# Patient Record
Sex: FEMALE | Race: BLACK OR AFRICAN AMERICAN | NOT HISPANIC OR LATINO | Employment: FULL TIME | URBAN - METROPOLITAN AREA
[De-identification: names, ages, dates, MRNs, and addresses within clinical notes are randomized per-mention and may not be internally consistent; named-entity substitution may affect disease eponyms.]

---

## 2023-12-08 ENCOUNTER — HOSPITAL ENCOUNTER (EMERGENCY)
Facility: CLINIC | Age: 28
Discharge: HOME | End: 2023-12-08
Attending: EMERGENCY MEDICINE | Admitting: EMERGENCY MEDICINE
Payer: COMMERCIAL

## 2023-12-08 ENCOUNTER — APPOINTMENT (OUTPATIENT)
Dept: GENERAL RADIOLOGY | Facility: CLINIC | Age: 28
End: 2023-12-08
Attending: EMERGENCY MEDICINE
Payer: COMMERCIAL

## 2023-12-08 ENCOUNTER — HOSPITAL ENCOUNTER (EMERGENCY)
Dept: CARDIOLOGY | Facility: CLINIC | Age: 28
Discharge: HOME | End: 2023-12-08
Attending: EMERGENCY MEDICINE
Payer: COMMERCIAL

## 2023-12-08 ENCOUNTER — APPOINTMENT (OUTPATIENT)
Dept: CT IMAGING | Facility: CLINIC | Age: 28
End: 2023-12-08
Attending: EMERGENCY MEDICINE
Payer: COMMERCIAL

## 2023-12-08 VITALS
TEMPERATURE: 98.9 F | HEART RATE: 116 BPM | RESPIRATION RATE: 16 BRPM | SYSTOLIC BLOOD PRESSURE: 117 MMHG | OXYGEN SATURATION: 100 % | DIASTOLIC BLOOD PRESSURE: 87 MMHG

## 2023-12-08 DIAGNOSIS — R55 SYNCOPE AND COLLAPSE: ICD-10-CM

## 2023-12-08 DIAGNOSIS — R94.31 ABNORMAL ELECTROCARDIOGRAM: ICD-10-CM

## 2023-12-08 LAB
ANION GAP SERPL CALCULATED.3IONS-SCNC: 14 MMOL/L (ref 7–15)
ATRIAL RATE - MUSE: 62 BPM
ATRIAL RATE - MUSE: 69 BPM
BASOPHILS # BLD AUTO: ABNORMAL 10*3/UL
BASOPHILS # BLD MANUAL: 0 10E3/UL (ref 0–0.2)
BASOPHILS NFR BLD AUTO: ABNORMAL %
BASOPHILS NFR BLD MANUAL: 0 %
BUN SERPL-MCNC: 14.6 MG/DL (ref 6–20)
CALCIUM SERPL-MCNC: 9.2 MG/DL (ref 8.6–10)
CHLORIDE SERPL-SCNC: 102 MMOL/L (ref 98–107)
CREAT SERPL-MCNC: 0.7 MG/DL (ref 0.51–0.95)
D DIMER PPP FEU-MCNC: 0.83 UG/ML FEU (ref 0–0.5)
DEPRECATED HCO3 PLAS-SCNC: 23 MMOL/L (ref 22–29)
DIASTOLIC BLOOD PRESSURE - MUSE: NORMAL MMHG
DIASTOLIC BLOOD PRESSURE - MUSE: NORMAL MMHG
EGFRCR SERPLBLD CKD-EPI 2021: >90 ML/MIN/1.73M2
EOSINOPHIL # BLD AUTO: ABNORMAL 10*3/UL
EOSINOPHIL # BLD MANUAL: 0.1 10E3/UL (ref 0–0.7)
EOSINOPHIL NFR BLD AUTO: ABNORMAL %
EOSINOPHIL NFR BLD MANUAL: 1 %
ERYTHROCYTE [DISTWIDTH] IN BLOOD BY AUTOMATED COUNT: 13.9 % (ref 10–15)
GLUCOSE SERPL-MCNC: 106 MG/DL (ref 70–99)
HCG SERPL QL: NEGATIVE
HCT VFR BLD AUTO: 45.1 % (ref 35–47)
HGB BLD-MCNC: 13.9 G/DL (ref 11.7–15.7)
HOLD SPECIMEN: NORMAL
IMM GRANULOCYTES # BLD: ABNORMAL 10*3/UL
IMM GRANULOCYTES NFR BLD: ABNORMAL %
INTERPRETATION ECG - MUSE: NORMAL
INTERPRETATION ECG - MUSE: NORMAL
LVEF ECHO: NORMAL
LYMPHOCYTES # BLD AUTO: ABNORMAL 10*3/UL
LYMPHOCYTES # BLD MANUAL: 1.8 10E3/UL (ref 0.8–5.3)
LYMPHOCYTES NFR BLD AUTO: ABNORMAL %
LYMPHOCYTES NFR BLD MANUAL: 29 %
MCH RBC QN AUTO: 26.9 PG (ref 26.5–33)
MCHC RBC AUTO-ENTMCNC: 30.8 G/DL (ref 31.5–36.5)
MCV RBC AUTO: 87 FL (ref 78–100)
MONOCYTES # BLD AUTO: ABNORMAL 10*3/UL
MONOCYTES # BLD MANUAL: 0.3 10E3/UL (ref 0–1.3)
MONOCYTES NFR BLD AUTO: ABNORMAL %
MONOCYTES NFR BLD MANUAL: 5 %
NEUTROPHILS # BLD AUTO: ABNORMAL 10*3/UL
NEUTROPHILS # BLD MANUAL: 4 10E3/UL (ref 1.6–8.3)
NEUTROPHILS NFR BLD AUTO: ABNORMAL %
NEUTROPHILS NFR BLD MANUAL: 65 %
NRBC # BLD AUTO: 0 10E3/UL
NRBC BLD AUTO-RTO: 0 /100
P AXIS - MUSE: 66 DEGREES
P AXIS - MUSE: 71 DEGREES
PLAT MORPH BLD: NORMAL
PLATELET # BLD AUTO: 218 10E3/UL (ref 150–450)
POTASSIUM SERPL-SCNC: 4.1 MMOL/L (ref 3.4–5.3)
PR INTERVAL - MUSE: 130 MS
PR INTERVAL - MUSE: 134 MS
QRS DURATION - MUSE: 66 MS
QRS DURATION - MUSE: 70 MS
QT - MUSE: 358 MS
QT - MUSE: 362 MS
QTC - MUSE: 367 MS
QTC - MUSE: 383 MS
R AXIS - MUSE: 48 DEGREES
R AXIS - MUSE: 57 DEGREES
RBC # BLD AUTO: 5.17 10E6/UL (ref 3.8–5.2)
RBC MORPH BLD: NORMAL
SODIUM SERPL-SCNC: 139 MMOL/L (ref 135–145)
SYSTOLIC BLOOD PRESSURE - MUSE: NORMAL MMHG
SYSTOLIC BLOOD PRESSURE - MUSE: NORMAL MMHG
T AXIS - MUSE: -25 DEGREES
T AXIS - MUSE: -36 DEGREES
TROPONIN T SERPL HS-MCNC: <6 NG/L
TROPONIN T SERPL HS-MCNC: <6 NG/L
TSH SERPL DL<=0.005 MIU/L-ACNC: 1.72 UIU/ML (ref 0.3–4.2)
VENTRICULAR RATE- MUSE: 62 BPM
VENTRICULAR RATE- MUSE: 69 BPM
WBC # BLD AUTO: 6.1 10E3/UL (ref 4–11)

## 2023-12-08 PROCEDURE — 96360 HYDRATION IV INFUSION INIT: CPT | Mod: 59

## 2023-12-08 PROCEDURE — 85027 COMPLETE CBC AUTOMATED: CPT | Performed by: EMERGENCY MEDICINE

## 2023-12-08 PROCEDURE — 84484 ASSAY OF TROPONIN QUANT: CPT | Performed by: EMERGENCY MEDICINE

## 2023-12-08 PROCEDURE — 85007 BL SMEAR W/DIFF WBC COUNT: CPT | Performed by: EMERGENCY MEDICINE

## 2023-12-08 PROCEDURE — 96361 HYDRATE IV INFUSION ADD-ON: CPT

## 2023-12-08 PROCEDURE — 93244 EXT ECG>48HR<7D REV&INTERPJ: CPT | Performed by: INTERNAL MEDICINE

## 2023-12-08 PROCEDURE — 84703 CHORIONIC GONADOTROPIN ASSAY: CPT | Performed by: EMERGENCY MEDICINE

## 2023-12-08 PROCEDURE — 85379 FIBRIN DEGRADATION QUANT: CPT | Performed by: EMERGENCY MEDICINE

## 2023-12-08 PROCEDURE — 93005 ELECTROCARDIOGRAM TRACING: CPT | Mod: XU

## 2023-12-08 PROCEDURE — 71275 CT ANGIOGRAPHY CHEST: CPT

## 2023-12-08 PROCEDURE — 93306 TTE W/DOPPLER COMPLETE: CPT | Mod: 26 | Performed by: INTERNAL MEDICINE

## 2023-12-08 PROCEDURE — 80048 BASIC METABOLIC PNL TOTAL CA: CPT | Performed by: EMERGENCY MEDICINE

## 2023-12-08 PROCEDURE — 36415 COLL VENOUS BLD VENIPUNCTURE: CPT | Performed by: EMERGENCY MEDICINE

## 2023-12-08 PROCEDURE — 99285 EMERGENCY DEPT VISIT HI MDM: CPT | Mod: 25

## 2023-12-08 PROCEDURE — 93242 EXT ECG>48HR<7D RECORDING: CPT

## 2023-12-08 PROCEDURE — 71046 X-RAY EXAM CHEST 2 VIEWS: CPT

## 2023-12-08 PROCEDURE — 84443 ASSAY THYROID STIM HORMONE: CPT | Performed by: EMERGENCY MEDICINE

## 2023-12-08 PROCEDURE — 250N000009 HC RX 250: Performed by: EMERGENCY MEDICINE

## 2023-12-08 PROCEDURE — 250N000011 HC RX IP 250 OP 636: Performed by: EMERGENCY MEDICINE

## 2023-12-08 PROCEDURE — 258N000003 HC RX IP 258 OP 636: Performed by: EMERGENCY MEDICINE

## 2023-12-08 PROCEDURE — 93306 TTE W/DOPPLER COMPLETE: CPT

## 2023-12-08 RX ORDER — IOPAMIDOL 755 MG/ML
500 INJECTION, SOLUTION INTRAVASCULAR ONCE
Status: COMPLETED | OUTPATIENT
Start: 2023-12-08 | End: 2023-12-08

## 2023-12-08 RX ADMIN — SODIUM CHLORIDE 100 ML: 9 INJECTION, SOLUTION INTRAVENOUS at 10:29

## 2023-12-08 RX ADMIN — IOPAMIDOL 51 ML: 755 INJECTION, SOLUTION INTRAVENOUS at 10:29

## 2023-12-08 RX ADMIN — SODIUM CHLORIDE 1000 ML: 9 INJECTION, SOLUTION INTRAVENOUS at 10:04

## 2023-12-08 ASSESSMENT — ACTIVITIES OF DAILY LIVING (ADL): ADLS_ACUITY_SCORE: 35

## 2023-12-08 NOTE — ED PROVIDER NOTES
History     Chief Complaint:  Syncope    The history is provided by the patient.     Amy Aparicio is a 28 year old female presenting via EMS for evaluation of syncope. Amy explains that she experienced a syncopal episode at work earlier today. She notes hitting the right side of her head. She reports chronic intermittent shortness of breath. She denies chest pain or shortness of breath prior to losing consciousness. She denies back pain, neck pain, diplopia, or blurred vision. She denies abdominal pain, nausea, vomiting, or diarrhea. She denies a personal or family history of heart or lung disease. She denies a concern for pregnancy. Amy reports that she experienced a similar syncopal event six years ago and was not seen afterwards.    Independent Historian:   None - Patient Only    Medications:    The patient has no known daily or prescription medications.    Past Medical History:    The patient has no known pertinent past medical history.    Physical Exam   Patient Vitals for the past 24 hrs:   BP Temp Temp src Pulse Resp SpO2   12/08/23 0930 117/87 -- -- 116 -- 100 %   12/08/23 0920 113/73 -- -- 70 -- --   12/08/23 0747 (!) 114/90 98.9  F (37.2  C) Temporal 77 16 100 %     Physical Exam  General: Alert, appears well-developed and well-nourished. Cooperative.     In mild distress  HEENT:  Head:  Atraumatic  Ears:  External ears are normal  Mouth/Throat:  Oropharynx is without erythema or exudate and mucous membranes are moist.   Eyes:   Conjunctivae normal and EOM are normal. No scleral icterus.  CV:  Normal rate, regular rhythm, normal heart sounds and radial pulses are 2+ and symmetric.  No murmur.  Resp:  Breath sounds are clear bilaterally    Non-labored, no retractions or accessory muscle use  GI:  Abdomen is soft, no distension, no tenderness. No rebound or guarding.  No CVA tenderness bilaterally  MS:  Normal range of motion. No edema.    Normal strength in all 4 extremities.     Back atraumatic.    No  Declines a colonoscopy but is willing to go for cologuard midline cervical, thoracic, or lumbar tenderness  Skin:  Warm and dry.  No rash or lesions noted.  Neuro:   Alert. Normal strength.  GCS: 15  Psych: Normal mood and affect.    Emergency Department Course   ECG  ECG taken at 0808, ECG read at 0811  Sinus rhythm with marked sinus arrhythmia with occasional premature ventricular complexes  T wave abnormality, consider inferior ischemia  T wave abnormality, consider anterolateral ischemia  Abnormal ECG  Rate 69 bpm. MO interval 134 ms. QRS duration 70 ms. QT/QTc 358/383 ms. P-R-T axes 66 57 -36.     Imaging:  CT Chest Pulmonary Embolism w Contrast   Final Result   IMPRESSION:   No acute process demonstrated.      IRMA TOPETE MD            SYSTEM ID:  [id number]      XR Chest 2 Views   Final Result   IMPRESSION: No focal consolidation, pleural effusion or pneumothorax.   Cardiomediastinal silhouette is unremarkable.       JOSELIN VILLA MD            SYSTEM ID:  [id number]         Laboratory:  Labs Ordered and Resulted from Time of ED Arrival to Time of ED Departure   BASIC METABOLIC PANEL - Abnormal       Result Value    Sodium 139      Potassium 4.1      Chloride 102      Carbon Dioxide (CO2) 23      Anion Gap 14      Urea Nitrogen 14.6      Creatinine 0.70      GFR Estimate >90      Calcium 9.2      Glucose 106 (*)    CBC WITH PLATELETS AND DIFFERENTIAL - Abnormal    WBC Count 6.1      RBC Count 5.17      Hemoglobin 13.9      Hematocrit 45.1      MCV 87      MCH 26.9      MCHC 30.8 (*)     RDW 13.9      Platelet Count 218      % Neutrophils        % Lymphocytes        % Monocytes        % Eosinophils        % Basophils        % Immature Granulocytes        NRBCs per 100 WBC 0      Absolute Neutrophils        Absolute Lymphocytes        Absolute Monocytes        Absolute Eosinophils        Absolute Basophils        Absolute Immature Granulocytes        Absolute NRBCs 0.0     D DIMER QUANTITATIVE - Abnormal    D-Dimer Quantitative 0.83 (*)    TROPONIN T, HIGH  SENSITIVITY - Normal    Troponin T, High Sensitivity <6     HCG QUALITATIVE PREGNANCY - Normal    hCG Serum Qualitative Negative     TROPONIN T, HIGH SENSITIVITY - Normal    Troponin T, High Sensitivity <6     TSH WITH FREE T4 REFLEX - Normal    TSH 1.72     DIFFERENTIAL    % Neutrophils 65      % Lymphocytes 29      % Monocytes 5      % Eosinophils 1      % Basophils 0      Absolute Neutrophils 4.0      Absolute Lymphocytes 1.8      Absolute Monocytes 0.3      Absolute Eosinophils 0.1      Absolute Basophils 0.0      RBC Morphology Confirmed RBC Indices      Platelet Assessment        Value: Automated Count Confirmed. Platelet morphology is normal.      Emergency Department Course & Assessments:       Interventions:  Medications   sodium chloride 0.9% BOLUS 1,000 mL (0 mLs Intravenous Stopped 12/8/23 1222)   iopamidol (ISOVUE-370) solution 500 mL (51 mLs Intravenous $Given 12/8/23 1029)     Assessments:  0909 I obtained history and examined the patient as noted above.  1006 I rechecked the patient.    1205 I discussed findings and discharge with the patient. All questions answered.     Independent Interpretation (X-rays, CTs, rhythm strip):  I reviewed the patient's chest X-ray and note no pneumothorax or pneumonia.    Consultations/Discussion of Management or Tests:  ED Course as of 12/08/23 1704   Fri Dec 08, 2023   1109 I spoke with Dr. Franco Arteaga, cardiology, regarding the patient's history and presentation in the emergency department today.    1115 I spoke again with Dr. Arteaga, cardiology.     Social Determinants of Health affecting care:   Healthcare Access/Compliance    Disposition:  The patient was discharged to home.     Impression & Plan    Medical Decision Making:  Amy Aparicio is a 28 year old female who presents with a history and clinical exam consistent with syncope.  While vasovagal/orthostatic hypotension was the most likely etiology given the history of this patient, I considered a  broad differential for their syncope today including cardiac arrythmia, ACS, aortic stenosis, HOCM, PE, orthostatic hypotension, drugs, situational, carotid hypersensitivity, seizure, TIA, stroke, vasovagal.   Workup relatively reassuring except for a abnormal baseline EKG.  There are no prior EKGs to compare with but patient has relatively diffuse T wave inversions in the inferior anterior and lateral leads.  She has no active chest pain or prodromal symptoms prior to her syncopal event.  Troponin undetectable and low suspicion for ACS.  I did speak with cardiology briefly in regards the patient's presentation and abnormal EKG.  They had recommended outpatient echocardiogram, Holter monitor, and close outpatient follow-up in cardiology clinic.  In addition,she has no family history of sudden death, no chest pain, no seizure activity or post-ictal period, no murmur, and no signs of persistent orthostasis in the ED, no focal neurologic symptoms, and no complaints of concerning headache.  The workup in the ED is negative and the physical exam is re-assurring.  Supportive outpatient management is therefore indicated.  Patient was discharged to cardiopulmonary for outpatient echocardiogram following ED visit today.    Diagnosis:    ICD-10-CM    1. Syncope and collapse  R55 Echocardiogram Complete     Leadless EKG Monitor 3 to 14 Days     Follow-Up with Cardiology      2. Abnormal electrocardiogram  R94.31 Echocardiogram Complete     Leadless EKG Monitor 3 to 14 Days     Follow-Up with Cardiology        Scribe Disclosure:  IMartha, am serving as a scribe at 9:09 AM on 12/8/2023 to document services personally performed by Kingsley Yi MD based on my observations and the provider's statements to me.   12/8/2023   Kingsley Yi MD White, Scott, MD  12/08/23 5709

## 2023-12-08 NOTE — DISCHARGE INSTRUCTIONS
Abnormal EKG today.  Thank you the rest of your workup was normal.  You should go for an echocardiogram or ultrasound of your heart as well as a heart monitor and plan to follow-up with a cardiologist in the next week.  Please return to the emergency department if you have a repeat episode of passing out or any new symptoms develop.  If your echocardiogram is abnormal they may send you back to the emergency department for repeat evaluation.    Discharge Instructions  Syncope    Syncope (fainting) is a sudden, short loss of consciousness (passing out spell). People will usually fall to the ground when they faint or slump over if seated.  People may also shake when this happens, and it can sometimes be difficult to tell the difference between syncope and a seizure. At this time, your provider does not find a reason to suspect that your fainting spell is a sign of anything dangerous or life-threatening.  However, sometimes the signs of serious illness do not show up right away.     Generally, every Emergency Department visit should have a follow-up clinic visit with either a primary or a specialty clinic/provider. Please follow-up as instructed by your emergency provider today.    Return to the Emergency Department if:  You faint again.   You have any significant bleeding.  You have chest pain or a fast or irregular heartbeat.  You feel short of breath.  You cough up any blood.  You have abdominal (belly) pain or unusual back pain.  You have ongoing vomiting (throwing up) or diarrhea (loose stools).  You have a black or tarry bowel movement, or blood in the stool or in your vomit.  You have a fever over 101 F.  You lose feeling or cannot move a part of your body or cannot talk normally.  You are confused, have a headache, cannot see well, or have a seizure.  DO NOT DRIVE. CALL 911 INSTEAD!    What can I do to help myself?  Follow any specific instructions that your provider discussed with you.  If you feel  light-headed, make sure to sit down right away, even if you have to sit on the floor.  Follow up with your regular medical provider as discussed for further management. This may include lowering your blood pressure medications, insulin or other diabetic medications, checking your blood sugar more frequently, and drinking more fluids, taking medicines for vomiting or diarrhea or getting up slower.  If you were given a prescription for medicine here today, be sure to read all of the information (including the package insert) that comes with your prescription.  This will include important information about the medicine, its side effects, and any warnings that you need to know about.  The pharmacist who fills the prescription can provide more information and answer questions you may have about the medicine.  If you have questions or concerns that the pharmacist cannot address, please call or return to the Emergency Department.   Remember that you can always come back to the Emergency Department if you are not able to see your regular provider in the amount of time listed above, if you get any new symptoms, or if there is anything that worries you.

## 2023-12-08 NOTE — ED TRIAGE NOTES
Pt BIBA after syncopal episode falling hitting head at work today.  She reports that all she remembers was getting needles at work then waking up on the ground. Pt having head and left knee pain.     Triage Assessment (Adult)       Row Name 12/08/23 0746          Triage Assessment    Airway WDL WDL        Respiratory WDL    Respiratory WDL WDL        Cardiac WDL    Cardiac WDL WDL

## 2024-03-29 ENCOUNTER — OFFICE VISIT (OUTPATIENT)
Dept: CARDIOLOGY | Facility: CLINIC | Age: 29
End: 2024-03-29
Attending: EMERGENCY MEDICINE
Payer: COMMERCIAL

## 2024-03-29 VITALS
SYSTOLIC BLOOD PRESSURE: 122 MMHG | OXYGEN SATURATION: 100 % | DIASTOLIC BLOOD PRESSURE: 82 MMHG | HEART RATE: 85 BPM | WEIGHT: 113.9 LBS

## 2024-03-29 DIAGNOSIS — R55 SYNCOPE AND COLLAPSE: Primary | ICD-10-CM

## 2024-03-29 DIAGNOSIS — R94.31 ABNORMAL ELECTROCARDIOGRAM: ICD-10-CM

## 2024-03-29 DIAGNOSIS — R63.4 WEIGHT LOSS: ICD-10-CM

## 2024-03-29 PROCEDURE — 93000 ELECTROCARDIOGRAM COMPLETE: CPT | Performed by: INTERNAL MEDICINE

## 2024-03-29 PROCEDURE — 99203 OFFICE O/P NEW LOW 30 MIN: CPT | Performed by: INTERNAL MEDICINE

## 2024-03-29 NOTE — PROGRESS NOTES
HPI and Plan:   Amy is a 29-year-old woman referred because syncope, near syncope, lightheadedness and an abnormal EKG.    Amy has no significant past medical history.  She is on no medications.  She was seen in the ER on December 8 after a syncopal spell where she struck her head.  She describes lightheadedness and dizziness that can occur while sitting or standing.  It never occurs while lying down.  She does not describe orthostatic symptoms.  She has no chest arm neck jaw or shoulder discomfort.  She has no dyspnea on exertion orthopnea or PND.  She relates she has lost about 22 pounds of weight over the last 6 months.  She thinks she is eating adequately.  She states she may not drink enough fluids.      Workup in the ER included normal CMP, CBC, troponin, TSH, D-dimer.  EKG demonstrated marked sinus arrhythmia with rate of 62.    Subsequent echocardiogram was normal with ejection fraction of 55 to 60%.  A 6-day Zio patch demonstrated average heart rate of 79 ranging from 45-1 71.  Higher heart rates were sinus tachycardia.  Her triggered events were just isolated PVCs.  She had 23% PVCs.  30.2% PACs.  EKG today demonstrates a normal sinus rhythm with a short [place] interval otherwise normal.    Patient states she feels lightheaded in the office.  Systolic blood pressure is 98.    Assessment and plan.  I do not think Amy has any underlying cardiac pathology.  She has a structurally normal heart.  No symptoms to suggest ischemia.  No significant arrhythmias are noted.  I suspect she is thin asthenic with 22 pound weight loss and episodes are most likely related to the hypotension.  I have told her she needs to drink plenty of fluids.  She should increase the salt in her diet.  She needs to eat more and gain weight.    At this time I do not think any further cardiac evaluation or follow-up are indicated. Thank you for allow me to participate in this patient's care.  Sincerely,                               Michael  [facility]      Today's clinic visit entailed:  Review of external notes as documented elsewhere in note  Review of the result(s) of each unique test - EKG, echocardiogram, Zio Patch, lab work  The following tests were independently interpreted by me as noted in my documentation: EKG, Zio Patch  30 minutes spent by me on the date of the encounter doing chart review, history and exam, documentation and further activities per the note  Provider  Link to MDM Help Grid     The level of medical decision making during this visit was of moderate complexity.      Orders Placed This Encounter   Procedures    EKG 12-lead complete w/read - Clinics (performed today)       No orders of the defined types were placed in this encounter.      There are no discontinued medications.      Encounter Diagnoses   Name Primary?    Syncope and collapse Yes    Abnormal electrocardiogram     Weight loss        CURRENT MEDICATIONS:  No current outpatient medications on file.       ALLERGIES   No Known Allergies    PAST MEDICAL HISTORY:  No past medical history on file.    PAST SURGICAL HISTORY:  No past surgical history on file.    FAMILY HISTORY:  No family history on file.    SOCIAL HISTORY:  Social History     Socioeconomic History    Marital status: Single     Spouse name: None    Number of children: None    Years of education: None    Highest education level: None   Tobacco Use    Smoking status: Never    Smokeless tobacco: Never   Substance and Sexual Activity    Alcohol use: Never    Drug use: Never       Review of Systems:  Skin:  not assessed       Eyes:  not assessed      ENT:  not assessed      Respiratory:  Negative       Cardiovascular:    lightheadedness;dizziness;syncope or near-syncope;Positive for    Gastroenterology: not assessed      Genitourinary:  not assessed      Musculoskeletal:  not assessed      Neurologic:  not assessed      Psychiatric:  not assessed      Heme/Lymph/Imm:  not assessed      Endocrine:  not  assessed        Physical Exam:  Vitals: /82 (BP Location: Right arm, Patient Position: Sitting, Cuff Size: Adult Regular)   Pulse 85   Wt 51.7 kg (113 lb 14.4 oz)   SpO2 100%     Constitutional:  cooperative, alert and oriented, well developed, well nourished, in no acute distress thin      Skin:  warm and dry to the touch, no apparent skin lesions or masses noted          Head:  normocephalic, no masses or lesions        Eyes:  pupils equal and round, conjunctivae and lids unremarkable, sclera white, no xanthalasma, EOMS intact, no nystagmus        Lymph:      ENT:  no pallor or cyanosis, dentition good        Neck:  no carotid bruit        Respiratory:  normal breath sounds, clear to auscultation, normal A-P diameter, normal symmetry, normal respiratory excursion, no use of accessory muscles         Cardiac: regular rhythm;no murmurs, gallops or rubs detected                pulses full and equal                                        GI:           Extremities and Muscular Skeletal:  no edema;no spinal abnormalities noted;normal muscle strength and tone              Neurological:  no gross motor deficits        Psych:  affect appropriate, oriented to time, person and place        CC  Kingsley Yi MD  [facility]  [address]  [place] [address]

## 2024-03-29 NOTE — LETTER
3/29/2024    Physician No Ref-Primary  No address on file    RE: Amy Aparicio       Dear Colleague,     I had the pleasure of seeing Amy Aparicio in the [facility].  HPI and Plan:   Amy is a 29-year-old woman referred because syncope, near syncope, lightheadedness and an abnormal EKG.    Amy has no significant past medical history.  She is on no medications.  She was seen in the ER on December 8 after a syncopal spell where she struck her head.  She describes lightheadedness and dizziness that can occur while sitting or standing.  It never occurs while lying down.  She does not describe orthostatic symptoms.  She has no chest arm neck jaw or shoulder discomfort.  She has no dyspnea on exertion orthopnea or PND.  She relates she has lost about 22 pounds of weight over the last 6 months.  She thinks she is eating adequately.  She states she may not drink enough fluids.      Workup in the ER included normal CMP, CBC, troponin, TSH, D-dimer.  EKG demonstrated marked sinus arrhythmia with rate of 62.    Subsequent echocardiogram was normal with ejection fraction of 55 to 60%.  A 6-day Zio patch demonstrated average heart rate of 79 ranging from 45-1 71.  Higher heart rates were sinus tachycardia.  Her triggered events were just isolated PVCs.  She had 23% PVCs.  30.2% PACs.  EKG today demonstrates a normal sinus rhythm with a short [place] interval otherwise normal.    Patient states she feels lightheaded in the office.  Systolic blood pressure is 98.    Assessment and plan.  I do not think Amy has any underlying cardiac pathology.  She has a structurally normal heart.  No symptoms to suggest ischemia.  No significant arrhythmias are noted.  I suspect she is thin asthenic with 22 pound weight loss and episodes are most likely related to the hypotension.  I have told her she needs to drink plenty of fluids.  She should increase the salt in her diet.  She needs to eat more and gain weight.    At this  time I do not think any further cardiac evaluation or follow-up are indicated. Thank you for allow me to participate in this patient's care.  Sincerely,                               Michael Villagran MD [facility]      Today's clinic visit entailed:  Review of external notes as documented elsewhere in note  Review of the result(s) of each unique test - EKG, echocardiogram, Zio Patch, lab work  The following tests were independently interpreted by me as noted in my documentation: EKG, Zio Patch  30 minutes spent by me on the date of the encounter doing chart review, history and exam, documentation and further activities per the note  Provider  Link to [facility] Help Grid     The level of medical decision making during this visit was of moderate complexity.      Orders Placed This Encounter   Procedures    EKG 12-lead complete w/read - Clinics (performed today)       No orders of the defined types were placed in this encounter.      There are no discontinued medications.      Encounter Diagnoses   Name Primary?    Syncope and collapse Yes    Abnormal electrocardiogram     Weight loss        CURRENT MEDICATIONS:  No current outpatient medications on file.       ALLERGIES   No Known Allergies    PAST MEDICAL HISTORY:  No past medical history on file.    PAST SURGICAL HISTORY:  No past surgical history on file.    FAMILY HISTORY:  No family history on file.    SOCIAL HISTORY:  Social History     Socioeconomic History    Marital status: Single     Spouse name: None    Number of children: None    Years of education: None    Highest education level: None   Tobacco Use    Smoking status: Never    Smokeless tobacco: Never   Substance and Sexual Activity    Alcohol use: Never    Drug use: Never       Review of Systems:  Skin:  not assessed       Eyes:  not assessed      ENT:  not assessed      Respiratory:  Negative       Cardiovascular:    lightheadedness;dizziness;syncope or near-syncope;Positive for    Gastroenterology: not assessed       Genitourinary:  not assessed      Musculoskeletal:  not assessed      Neurologic:  not assessed      Psychiatric:  not assessed      Heme/Lymph/Imm:  not assessed      Endocrine:  not assessed        Physical Exam:  Vitals: /82 (BP Location: Right arm, Patient Position: Sitting, Cuff Size: Adult Regular)   Pulse 85   Wt 51.7 kg (113 lb 14.4 oz)   SpO2 100%     Constitutional:  cooperative, alert and oriented, well developed, well nourished, in no acute distress thin      Skin:  warm and dry to the touch, no apparent skin lesions or masses noted          Head:  normocephalic, no masses or lesions        Eyes:  pupils equal and round, conjunctivae and lids unremarkable, sclera white, no xanthalasma, EOMS intact, no nystagmus        Lymph:      ENT:  no pallor or cyanosis, dentition good        Neck:  no carotid bruit        Respiratory:  normal breath sounds, clear to auscultation, normal A-P diameter, normal symmetry, normal respiratory excursion, no use of accessory muscles         Cardiac: regular rhythm;no murmurs, gallops or rubs detected                pulses full and equal                                        GI:           Extremities and Muscular Skeletal:  no edema;no spinal abnormalities noted;normal muscle strength and tone              Neurological:  no gross motor deficits        Psych:  affect appropriate, oriented to time, person and place        CC  Kingsley Yi MD  [facility]  [address] [place] [id number]      Thank you for allowing me to participate in the care of your patient.      Sincerely,     Michael Villagran MD     [facility]

## 2025-01-07 ENCOUNTER — HOSPITAL ENCOUNTER (EMERGENCY)
Facility: CLINIC | Age: 30
Discharge: HOME | End: 2025-01-07
Attending: EMERGENCY MEDICINE | Admitting: EMERGENCY MEDICINE

## 2025-01-07 VITALS
RESPIRATION RATE: 18 BRPM | HEART RATE: 88 BPM | WEIGHT: 168.87 LBS | DIASTOLIC BLOOD PRESSURE: 88 MMHG | TEMPERATURE: 98.3 F | OXYGEN SATURATION: 100 % | SYSTOLIC BLOOD PRESSURE: 132 MMHG | BODY MASS INDEX: 25.01 KG/M2 | HEIGHT: 69 IN

## 2025-01-07 DIAGNOSIS — N90.89 VULVAR EDEMA: ICD-10-CM

## 2025-01-07 LAB
ALBUMIN UR-MCNC: NEGATIVE MG/DL
APPEARANCE UR: CLEAR
BILIRUB UR QL STRIP: NEGATIVE
COLOR UR AUTO: YELLOW
GLUCOSE UR STRIP-MCNC: NEGATIVE MG/DL
HCG UR QL: NEGATIVE
HGB UR QL STRIP: NEGATIVE
KETONES UR STRIP-MCNC: NEGATIVE MG/DL
LEUKOCYTE ESTERASE UR QL STRIP: ABNORMAL
MUCOUS THREADS #/AREA URNS LPF: PRESENT /LPF
NITRATE UR QL: NEGATIVE
PH UR STRIP: 6 [PH] (ref 5–7)
RBC URINE: <1 /HPF
SP GR UR STRIP: 1.02 (ref 1–1.03)
SQUAMOUS EPITHELIAL: 1 /HPF
T VAGINALIS DNA SPEC QL NAA+PROBE: NOT DETECTED
UROBILINOGEN UR STRIP-MCNC: NORMAL MG/DL
WBC URINE: <1 /HPF

## 2025-01-07 PROCEDURE — 99284 EMERGENCY DEPT VISIT MOD MDM: CPT

## 2025-01-07 PROCEDURE — 87661 TRICHOMONAS VAGINALIS AMPLIF: CPT | Performed by: EMERGENCY MEDICINE

## 2025-01-07 PROCEDURE — 87491 CHLMYD TRACH DNA AMP PROBE: CPT | Performed by: EMERGENCY MEDICINE

## 2025-01-07 PROCEDURE — 81003 URINALYSIS AUTO W/O SCOPE: CPT | Performed by: EMERGENCY MEDICINE

## 2025-01-07 PROCEDURE — 81025 URINE PREGNANCY TEST: CPT | Performed by: EMERGENCY MEDICINE

## 2025-01-07 PROCEDURE — 250N000013 HC RX MED GY IP 250 OP 250 PS 637: Performed by: EMERGENCY MEDICINE

## 2025-01-07 RX ORDER — OXYCODONE AND ACETAMINOPHEN 5; 325 MG/1; MG/1
2 TABLET ORAL ONCE
Status: COMPLETED | OUTPATIENT
Start: 2025-01-07 | End: 2025-01-07

## 2025-01-07 RX ORDER — DOXYCYCLINE 100 MG/1
100 CAPSULE ORAL 2 TIMES DAILY
Qty: 14 CAPSULE | Refills: 0 | Status: SHIPPED | OUTPATIENT
Start: 2025-01-07 | End: 2025-01-14

## 2025-01-07 RX ORDER — IBUPROFEN 200 MG
400 TABLET ORAL EVERY 8 HOURS PRN
COMMUNITY
Start: 2025-01-07

## 2025-01-07 RX ORDER — ACETAMINOPHEN 500 MG
1000 TABLET ORAL EVERY 8 HOURS PRN
COMMUNITY
Start: 2025-01-07

## 2025-01-07 RX ORDER — OXYCODONE HYDROCHLORIDE 5 MG/1
5 TABLET ORAL EVERY 6 HOURS PRN
Qty: 12 TABLET | Refills: 0 | Status: SHIPPED | OUTPATIENT
Start: 2025-01-07 | End: 2025-01-10

## 2025-01-07 RX ADMIN — OXYCODONE HYDROCHLORIDE AND ACETAMINOPHEN 2 TABLET: 5; 325 TABLET ORAL at 16:22

## 2025-01-07 ASSESSMENT — COLUMBIA-SUICIDE SEVERITY RATING SCALE - C-SSRS
2. HAVE YOU ACTUALLY HAD ANY THOUGHTS OF KILLING YOURSELF IN THE PAST MONTH?: NO
6. HAVE YOU EVER DONE ANYTHING, STARTED TO DO ANYTHING, OR PREPARED TO DO ANYTHING TO END YOUR LIFE?: NO
1. IN THE PAST MONTH, HAVE YOU WISHED YOU WERE DEAD OR WISHED YOU COULD GO TO SLEEP AND NOT WAKE UP?: NO

## 2025-01-07 ASSESSMENT — ACTIVITIES OF DAILY LIVING (ADL)
ADLS_ACUITY_SCORE: 41
ADLS_ACUITY_SCORE: 41

## 2025-01-07 NOTE — ED TRIAGE NOTES
Pt complains of vaginal pain that radiates into lower back. No chance of pregnancy. No unprotected sex.

## 2025-01-07 NOTE — DISCHARGE INSTRUCTIONS
Return for worsening swelling, pain, fevers, new concerns. There was not enough swelling on exam to perform a procedure to drain the area. Please monitor your response to antibiotics and follow up with gynecology.

## 2025-01-07 NOTE — ED PROVIDER NOTES
"  Emergency Department Note      History of Present Illness     Chief Complaint   Vaginal Pain      HPI   Amy Aparicio is a 29 year old female who presents with 1 day of R labial swelling and discomfort. She just finished her menstrual cycle. She has had some R flank pain but no fever, dysuria or hematuria. She is not sure if the back pain is related to her recent menstrual cycle. No abdominal pain. NO abnormal vaginal discharge. She is sexually active.       Past Medical History     Medical History and Problem List   No past medical history on file.    Medications   acetaminophen (TYLENOL) 500 MG tablet  amoxicillin-clavulanate (AUGMENTIN) 875-125 MG tablet  doxycycline hyclate (VIBRAMYCIN) 100 MG capsule  ibuprofen (ADVIL/MOTRIN) 200 MG tablet  oxyCODONE (ROXICODONE) 5 MG tablet        Surgical History   No past surgical history on file.    Physical Exam     Patient Vitals for the past 24 hrs:   BP Temp Temp src Pulse Resp SpO2 Height Weight   01/07/25 1625 132/88 -- -- 88 18 100 % -- --   01/07/25 1452 136/87 98.3  F (36.8  C) Oral 92 17 100 % 1.753 m (5' 9\") 76.6 kg (168 lb 14 oz)     Physical Exam  VS: Reviewed per above  HENT: Mucous membranes moist  EYES: sclera anicteric  CV: Rate as noted,  regular rhythm.   RESP: Effort normal.  GI: no tenderness/rebound/guarding, not distended.  Chaperoned pelvic exam: No abnormal vaginal discharge.  There is some edema/fullness of the right superior labia minora region without underlying fluctuance or discharge.  This area is also quite tender.  No abnormal lesions or blisters in the vulvar region.  NEURO: Alert, moving all extremities  MSK: No deformity of the extremities  SKIN: Warm and dry      Diagnostics     Lab Results   Labs Ordered and Resulted from Time of ED Arrival to Time of ED Departure   ROUTINE UA WITH MICROSCOPIC REFLEX TO CULTURE - Abnormal       Result Value    Color Urine Yellow      Appearance Urine Clear      Glucose Urine Negative      Bilirubin " Urine Negative      Ketones Urine Negative      Specific Gravity Urine 1.025      Blood Urine Negative      pH Urine 6.0      Protein Albumin Urine Negative      Urobilinogen Urine Normal      Nitrite Urine Negative      Leukocyte Esterase Urine Trace (*)     Mucus Urine Present (*)     RBC Urine <1      WBC Urine <1      Squamous Epithelials Urine 1     HCG QUALITATIVE URINE - Normal    hCG Urine Qualitative Negative     CHLAMYDIA TRACHOMATIS/NEISSERIA GONORRHOEAE BY PCR   TRICHOMONAS VAGINALIS BY PCR         ED Course      Medications Administered   Medications   oxyCODONE-acetaminophen (PERCOCET) 5-325 MG per tablet 2 tablet (2 tablets Oral $Given 1/7/25 9747)       Procedures   Procedures       Medical Decision Making / Diagnosis         TAMRA Aparicio is a 29 year old female who presents to the ER for evaluation of 1 day of discomfort and swelling of the right labial region.  On arrival vital signs are reassuring.  Abdominal exam benign.  No evidence of UTI or pregnancy.  On chaperoned exam, she has some edema and fullness of the right superior labia minora but no clear signs of underlying abscess.  Gonorrhea chlamydia and trichomonas testing was sent.  At this time, I do not believe that she would benefit from I&D but I will start her on antibiotics and refer her to gynecology for further evaluation.  Discussed pain control ibuprofen and Tylenol and as needed oxycodone for breakthrough pain management.  Return precautions discussed prior to discharge.    Disposition   The patient was discharged.     Diagnosis     ICD-10-CM    1. Vulvar edema  N90.89     right superior, labia minora           Discharge Medications   Discharge Medication List as of 1/7/2025  4:15 PM        START taking these medications    Details   acetaminophen (TYLENOL) 500 MG tablet Take 2 tablets (1,000 mg) by mouth every 8 hours as needed for pain., OTC      amoxicillin-clavulanate (AUGMENTIN) 875-125 MG tablet Take 1 tablet by mouth  2 times daily for 7 days., Disp-14 tablet, R-0, Local Print      doxycycline hyclate (VIBRAMYCIN) 100 MG capsule Take 1 capsule (100 mg) by mouth 2 times daily for 7 days., Disp-14 capsule, R-0, Local Print      ibuprofen (ADVIL/MOTRIN) 200 MG tablet Take 2 tablets (400 mg) by mouth every 8 hours as needed for pain., OTC      oxyCODONE (ROXICODONE) 5 MG tablet Take 1 tablet (5 mg) by mouth every 6 hours as needed for breakthrough pain., Disp-12 tablet, R-0, Local Print              Augustin Pelaez MD  01/07/25 9520

## 2025-01-08 LAB
C TRACH DNA SPEC QL PROBE+SIG AMP: NEGATIVE
N GONORRHOEA DNA SPEC QL NAA+PROBE: NEGATIVE
SPECIMEN TYPE: NORMAL